# Patient Record
Sex: MALE | Race: OTHER | Employment: FULL TIME | URBAN - METROPOLITAN AREA
[De-identification: names, ages, dates, MRNs, and addresses within clinical notes are randomized per-mention and may not be internally consistent; named-entity substitution may affect disease eponyms.]

---

## 2019-07-09 ENCOUNTER — TELEPHONE (OUTPATIENT)
Dept: FAMILY MEDICINE CLINIC | Facility: CLINIC | Age: 44
End: 2019-07-09

## 2019-07-09 NOTE — TELEPHONE ENCOUNTER
Can continue tylenol/motrin as needed for fever greater than 100.4 or headaches/body aches. Sounds like he has a virus and needs to give it some more time. If feeling worse or not getting better a week from now, come in and see me.

## 2019-07-09 NOTE — TELEPHONE ENCOUNTER
Patient states symptoms started Sunday. States he thought symptoms might have been related to his activity on Sunday. He played golf outside in the sun. Went to dinner and felt fine. Sunday night temp 99.1 .   Since then temp has fluctuated between 99.1-1

## 2019-07-09 NOTE — TELEPHONE ENCOUNTER
PT CALLED AND ADV PT HAS HAD FEVER SINCE Sunday. CHILLS STARTED  Monday. HAS BEEN TAKING OTC TYLENOL AND MOTRIN. FEVER GOES AWAY WITH MEDS.      Karlee

## 2019-07-11 ENCOUNTER — NURSE ONLY (OUTPATIENT)
Dept: FAMILY MEDICINE CLINIC | Facility: CLINIC | Age: 44
End: 2019-07-11
Payer: COMMERCIAL

## 2019-07-11 VITALS
BODY MASS INDEX: 27.58 KG/M2 | DIASTOLIC BLOOD PRESSURE: 80 MMHG | OXYGEN SATURATION: 94 % | HEIGHT: 68 IN | TEMPERATURE: 101 F | SYSTOLIC BLOOD PRESSURE: 124 MMHG | HEART RATE: 104 BPM | WEIGHT: 182 LBS | RESPIRATION RATE: 18 BRPM

## 2019-07-11 DIAGNOSIS — R50.9 FEVER, UNSPECIFIED FEVER CAUSE: Primary | ICD-10-CM

## 2019-07-11 DIAGNOSIS — R05.9 COUGH: ICD-10-CM

## 2019-07-11 DIAGNOSIS — J02.9 SORE THROAT: ICD-10-CM

## 2019-07-11 LAB
CONTROL LINE PRESENT WITH A CLEAR BACKGROUND (YES/NO): YES YES/NO
STREP GRP A CUL-SCR: NEGATIVE

## 2019-07-11 PROCEDURE — 87880 STREP A ASSAY W/OPTIC: CPT | Performed by: NURSE PRACTITIONER

## 2019-07-11 PROCEDURE — 99213 OFFICE O/P EST LOW 20 MIN: CPT | Performed by: NURSE PRACTITIONER

## 2019-07-11 RX ORDER — IBUPROFEN 200 MG
200 TABLET ORAL EVERY 6 HOURS PRN
COMMUNITY
End: 2020-11-01 | Stop reason: ALTCHOICE

## 2019-07-11 RX ORDER — AZITHROMYCIN 250 MG/1
TABLET, FILM COATED ORAL
Qty: 6 TABLET | Refills: 0 | Status: SHIPPED | OUTPATIENT
Start: 2019-07-11 | End: 2020-11-01 | Stop reason: ALTCHOICE

## 2019-07-11 RX ORDER — BENZONATATE 200 MG/1
200 CAPSULE ORAL 3 TIMES DAILY PRN
Qty: 20 CAPSULE | Refills: 0 | Status: SHIPPED | OUTPATIENT
Start: 2019-07-11 | End: 2020-11-01 | Stop reason: ALTCHOICE

## 2019-07-11 RX ORDER — ACETAMINOPHEN 500 MG
500 TABLET ORAL EVERY 6 HOURS PRN
COMMUNITY
End: 2020-11-01 | Stop reason: ALTCHOICE

## 2019-07-11 NOTE — PATIENT INSTRUCTIONS
Febrile Illness with Uncertain Cause (Adult)  You have a fever, but the cause is unknown. A fever is a natural reaction of the body to an illness such as infection due to a virus or bacteria.  Sometimes other conditions such as cancer or immune diseases c · If you were given antibiotics for an infection, take them until they are used up, or your healthcare provider tells you to stop. It is important to finish the antibiotics even though you feel better. This is to make sure the infection has cleared.  Be олег © 6161-6746 [employer] [address]. [facility] [facility], [facility] [place]. All rights reserved. This information is not intended as a substitute for professional medical care. Always follow your healthcare professional's instructions.

## 2019-07-11 NOTE — PROGRESS NOTES
CHIEF COMPLAINT:   Patient presents with:  Fever: cough/congestion x 4 days. Sore Throat: x 1 day        HPI:   Jakie Halsted is a 45year old male who presents for cough/fever for  5  days.   tmax 101, reports with tylenol/motrin fever will only come do HENT: Denies ear pain/pressure, decreased hearing, or sore throat. Reports mild sinus/nasal congestion. CARDIOVASCULAR: Denies chest pain or palpitations  LUNGS: Per HPI. GI: Denies N/V/C/D or abdominal pain.       EXAM:   /80 (BP Location: Left ar Sig: Take two tablets by mouth today, then one daily. • Albuterol Sulfate (PROAIR RESPICLICK) 484 (90 Base) MCG/ACT Inhalation Aerosol Powder, Breath Activated 1 each 0     Sig: Inhale 1 Inhaler into the lungs every 4 to 6 hours as needed.          Becca · You can take acetaminophen or ibuprofen for pain or to lower your temperature, unless you were given a different medicine to use.  (Note: If you have chronic liver or kidney disease or have ever had a stomach ulcer or gastrointestinal bleeding, talk with Call your healthcare provider right away if any of these occur:  · Cough with lots of colored sputum (mucus) or blood in your sputum  · Severe headache  · Face, neck, throat, or ear pain  · Feeling drowsy  · Abdominal pain  · Repeated vomiting or diarrhea

## 2020-08-21 ENCOUNTER — TELEPHONE (OUTPATIENT)
Dept: FAMILY MEDICINE CLINIC | Facility: CLINIC | Age: 45
End: 2020-08-21

## 2020-08-24 ENCOUNTER — OFFICE VISIT (OUTPATIENT)
Dept: FAMILY MEDICINE CLINIC | Facility: CLINIC | Age: 45
End: 2020-08-24
Payer: COMMERCIAL

## 2020-08-24 ENCOUNTER — HOSPITAL ENCOUNTER (OUTPATIENT)
Dept: GENERAL RADIOLOGY | Age: 45
Discharge: HOME | End: 2020-08-24
Attending: FAMILY MEDICINE
Payer: COMMERCIAL

## 2020-08-24 VITALS
HEART RATE: 78 BPM | HEIGHT: 67 IN | TEMPERATURE: 98 F | WEIGHT: 182 LBS | DIASTOLIC BLOOD PRESSURE: 70 MMHG | SYSTOLIC BLOOD PRESSURE: 100 MMHG | BODY MASS INDEX: 28.56 KG/M2

## 2020-08-24 DIAGNOSIS — M10.9 ACUTE GOUT INVOLVING TOE OF RIGHT FOOT, UNSPECIFIED CAUSE: ICD-10-CM

## 2020-08-24 DIAGNOSIS — Z00.00 PREVENTATIVE HEALTH CARE: ICD-10-CM

## 2020-08-24 DIAGNOSIS — M79.674 PAIN AND SWELLING OF TOE OF RIGHT FOOT: ICD-10-CM

## 2020-08-24 DIAGNOSIS — M79.674 PAIN AND SWELLING OF TOE OF RIGHT FOOT: Primary | ICD-10-CM

## 2020-08-24 DIAGNOSIS — M79.89 PAIN AND SWELLING OF TOE OF RIGHT FOOT: ICD-10-CM

## 2020-08-24 DIAGNOSIS — M79.89 PAIN AND SWELLING OF TOE OF RIGHT FOOT: Primary | ICD-10-CM

## 2020-08-24 PROCEDURE — 99203 OFFICE O/P NEW LOW 30 MIN: CPT | Performed by: FAMILY MEDICINE

## 2020-08-24 PROCEDURE — 3008F BODY MASS INDEX DOCD: CPT | Performed by: FAMILY MEDICINE

## 2020-08-24 PROCEDURE — 3074F SYST BP LT 130 MM HG: CPT | Performed by: FAMILY MEDICINE

## 2020-08-24 PROCEDURE — 73660 X-RAY EXAM OF TOE(S): CPT | Performed by: FAMILY MEDICINE

## 2020-08-24 PROCEDURE — 3078F DIAST BP <80 MM HG: CPT | Performed by: FAMILY MEDICINE

## 2020-08-24 RX ORDER — INDOMETHACIN 50 MG/1
50 CAPSULE ORAL
Qty: 15 CAPSULE | Refills: 0 | Status: SHIPPED | OUTPATIENT
Start: 2020-08-24 | End: 2020-11-01 | Stop reason: ALTCHOICE

## 2020-08-24 NOTE — PROGRESS NOTES
Evan Beck is a 39year old male. Patient presents with: Toe Pain: R foot      HPI:   Right toe is swollen. Last weekend, it was so swollen he could hardly walk on it. He only had one sneaker he could wear. At first, thought it was turf toe.  Star 112/82  10/23/15 : 110/70      Wt Readings from Last 6 Encounters:  08/24/20 : 182 lb (82.6 kg)  07/11/19 : 182 lb (82.6 kg)  07/25/16 : 164 lb 12.8 oz (74.8 kg)  01/04/16 : 167 lb (75.8 kg)  10/28/15 : 176 lb (79.8 kg)  10/23/15 : 170 lb (77.1 kg)      RE PANEL (14)  -     LIPID PANEL    Acute gout involving toe of right foot, unspecified cause  -     indomethacin 50 MG Oral Cap; Take 1 capsule (50 mg total) by mouth 3 (three) times daily with meals.  For 2-3 days or until symptoms are better, then wean down

## 2020-09-23 ENCOUNTER — TELEPHONE (OUTPATIENT)
Dept: FAMILY MEDICINE CLINIC | Facility: CLINIC | Age: 45
End: 2020-09-23

## 2020-11-01 ENCOUNTER — OFFICE VISIT (OUTPATIENT)
Dept: FAMILY MEDICINE CLINIC | Facility: CLINIC | Age: 45
End: 2020-11-01
Payer: COMMERCIAL

## 2020-11-01 VITALS
DIASTOLIC BLOOD PRESSURE: 72 MMHG | HEART RATE: 76 BPM | RESPIRATION RATE: 16 BRPM | HEIGHT: 68 IN | WEIGHT: 182 LBS | OXYGEN SATURATION: 98 % | SYSTOLIC BLOOD PRESSURE: 104 MMHG | BODY MASS INDEX: 27.58 KG/M2 | TEMPERATURE: 97 F

## 2020-11-01 DIAGNOSIS — Z20.822 SUSPECTED COVID-19 VIRUS INFECTION: Primary | ICD-10-CM

## 2020-11-01 PROCEDURE — 3074F SYST BP LT 130 MM HG: CPT | Performed by: PHYSICIAN ASSISTANT

## 2020-11-01 PROCEDURE — 3008F BODY MASS INDEX DOCD: CPT | Performed by: PHYSICIAN ASSISTANT

## 2020-11-01 PROCEDURE — 99213 OFFICE O/P EST LOW 20 MIN: CPT | Performed by: PHYSICIAN ASSISTANT

## 2020-11-01 PROCEDURE — 3078F DIAST BP <80 MM HG: CPT | Performed by: PHYSICIAN ASSISTANT

## 2020-11-01 NOTE — PROGRESS NOTES
CHIEF COMPLAINT:     Patient presents with:  Cough: congestion/sinus pressure/body aches x 1 day.  no fever  Other: secondary exposure to covid-19      HPI:   Cyndie Mathias is a 39year old male who presents with cough, congestion, bodyache, headache that HEAD: atraumatic, normocephalic  EYES: conjunctiva clear, sclera white,  PERRLA  EARS: TM's clear  NOSE: nares patent, mucosa without congestion  THROAT: Posterior pharynx is non erythematous, small PND, no exudates.   NECK: supple, non-tender  LUNGS: clear [facility] is committed to the safety and well-being of our patients, members, employees, and communities.  As concerns arise about the new strain of coronavirus that causes COVID-19, [facility] is here to provide community members r 4. If you have a medical appointment, call the healthcare provider ahead of time and tell them that you have or may have COVID-19.  5. For medical emergencies, call 911 and notify the dispatch personnel that you have or may have COVID-19.   6. Cover your c · At least 10 days have passed since symptoms first appeared OR if asymptomatic patient or date of symptom onset is unclear then use 10 days post COVID test date.    · At least 20 days have passed for severe illness (requiring hospitalization) OR if you are *Some people will be required to have a repeat COVID-19 test in order to be eligible to donate. If you’re instructed by Cristal Chavez that a repeat test is required, please contact the [facility] COVID-19 Nurse Triage Line at [phone].     Additional Inf · You may use acetaminophen or ibuprofen to control pain and fever, unless another medicine was prescribed. If you have chronic liver or kidney disease, have ever had a stomach ulcer or gastrointestinal bleeding, or are taking blood-thinning medicines, jesse © 0528-3243 [employer]. [facility] [facility], [address] [place]. All rights reserved. This information is not intended as a substitute for professional medical care. Always follow your healthcare professional's instructions.             The

## 2020-11-01 NOTE — PATIENT INSTRUCTIONS
Coronavirus Disease 2019 (COVID-19)     [place] [address] is committed to the safety and well-being of our patients, members, employees, and communities.  As concerns arise about the new strain of coronavirus that causes COVID-19, [place] [address] 4. If you have a medical appointment, call the healthcare provider ahead of time and tell them that you have or may have COVID-19.  5. For medical emergencies, call 911 and notify the dispatch personnel that you have or may have COVID-19.   6. Cover your c · At least 10 days have passed since symptoms first appeared OR if asymptomatic patient or date of symptom onset is unclear then use 10 days post COVID test date.    · At least 20 days have passed for severe illness (requiring hospitalization) OR if you are *Some people will be required to have a repeat COVID-19 test in order to be eligible to donate. If you’re instructed by Paola Velez that a repeat test is required, please contact the [facility] COVID-19 Nurse Triage Line at [phone].     Additional Inf · You may use acetaminophen or ibuprofen to control pain and fever, unless another medicine was prescribed. If you have chronic liver or kidney disease, have ever had a stomach ulcer or gastrointestinal bleeding, or are taking blood-thinning medicines, jesse © 5378-0804 [employer] [address]. [address] [facility], [facility] [place]. All rights reserved. This information is not intended as a substitute for professional medical care. Always follow your healthcare professional's instructions.

## 2021-10-18 ENCOUNTER — OFFICE VISIT (OUTPATIENT)
Dept: FAMILY MEDICINE CLINIC | Facility: CLINIC | Age: 46
End: 2021-10-18
Payer: COMMERCIAL

## 2021-10-18 VITALS
RESPIRATION RATE: 16 BRPM | OXYGEN SATURATION: 98 % | BODY MASS INDEX: 28.44 KG/M2 | WEIGHT: 187.63 LBS | HEIGHT: 68 IN | TEMPERATURE: 98 F | HEART RATE: 86 BPM | SYSTOLIC BLOOD PRESSURE: 117 MMHG | DIASTOLIC BLOOD PRESSURE: 68 MMHG

## 2021-10-18 DIAGNOSIS — J06.9 VIRAL URI: ICD-10-CM

## 2021-10-18 DIAGNOSIS — J02.9 SORE THROAT: Primary | ICD-10-CM

## 2021-10-18 PROCEDURE — 3074F SYST BP LT 130 MM HG: CPT | Performed by: PHYSICIAN ASSISTANT

## 2021-10-18 PROCEDURE — 3078F DIAST BP <80 MM HG: CPT | Performed by: PHYSICIAN ASSISTANT

## 2021-10-18 PROCEDURE — 87880 STREP A ASSAY W/OPTIC: CPT | Performed by: PHYSICIAN ASSISTANT

## 2021-10-18 PROCEDURE — 3008F BODY MASS INDEX DOCD: CPT | Performed by: PHYSICIAN ASSISTANT

## 2021-10-18 PROCEDURE — 99213 OFFICE O/P EST LOW 20 MIN: CPT | Performed by: PHYSICIAN ASSISTANT

## 2021-10-18 RX ORDER — ALBUTEROL SULFATE 90 UG/1
1-2 AEROSOL, METERED RESPIRATORY (INHALATION) EVERY 6 HOURS PRN
Qty: 1 EACH | Refills: 0 | Status: SHIPPED | OUTPATIENT
Start: 2021-10-18

## 2021-10-18 RX ORDER — BENZONATATE 200 MG/1
200 CAPSULE ORAL 3 TIMES DAILY PRN
Qty: 30 CAPSULE | Refills: 0 | Status: SHIPPED | OUTPATIENT
Start: 2021-10-18 | End: 2021-10-28

## 2021-10-18 NOTE — PATIENT INSTRUCTIONS
-Sufaded  -Flonase    -Please quarantine until test results. -If positive, must quarantine-see guidelines below  -Tylenol, cool mist humidifier, push fluids.  -Go to ER with worsening symptoms.     Coronavirus Disease 2019 (COVID-19)     [address] [facility] health department if you need to quarantine.  Options they will consider include stopping quarantine  • After 14 days from date of last exposure  • After 10 days without testing from date of last exposure  • After day 7 from date of last exposure with a neg bedding   10. Clean all surfaces that are touched often, like counters, tabletops, and doorknobs. Use household cleaning sprays or wipes according to the label instructions.          Seek Further Care     If you are awaiting test results or are confirmed po you from an [place] representative. If you have not received a call within 2 business days, please call your primary care provider or check [place] for results.     Post-Discharge Follow-up  Please call your primary care provider within 2 days of yo [email or web address]. pdf  Centers for Disease Control & Prevention (CDC)  10 things you can do to manage your health at home, Annie.nl. pdf  ht

## 2021-10-18 NOTE — PROGRESS NOTES
CHIEF COMPLAINT:   Patient presents with:  Sore Throat    HPI:   Zane Brunner is a 42year old male who presents with URI symptoms for 3 days. Patient denies known COVID exposure. Patient is not vaccinated for COVID.        • Fever:     Yes []     No [x occasional, one here and there 1 pack lasts a month    Alcohol use:  Yes      Alcohol/week: 12.0 standard drinks      Types: 12 Cans of beer per week      Comment: 5 beers per week    Drug use: No        REVIEW OF SYSTEMS:   GENERAL: Normal appetite  SKIN: that are consistent with    ASSESSMENT:   Sore throat  (primary encounter diagnosis)  Viral uri    PLAN: Meds as below. See patient Instructions.  -Discussed limitations of WIC. Unable to further work up [facility]" in clinic.   Patient reports sens aftercare, and plasma donation.       Quarantine (for anyone in close contact with someone who has COVID-19)  Anyone who has been in close contact with someone who has COVID-19 should quarantine at home for 14 days from the time of exposure and follow the b quarantine for 14 days and recognizes that any quarantine shorter than 14 days balances reduced burden against a small possibility of spreading the virus. 10 Ways to Manage Your Health at Home      1.  Stay home from work, school, and away from other pub exposed or are not aware of an exposure to COVID-19 and are concerned about your symptoms, please contact your health care provider with any questions.     Home Isolation  If you have tested positive for COVID-19, you should remain under home isolation prec antibodies against the virus. The antibodies in plasma can be used as a treatment for patients in our community who are most severely affected by the virus. How can I donate convalescent plasma?     The process for donating plasma is very similar to [place]

## 2022-12-02 ENCOUNTER — HOSPITAL ENCOUNTER (OUTPATIENT)
Age: 47
Discharge: HOME | End: 2022-12-02
Payer: COMMERCIAL

## 2022-12-02 ENCOUNTER — APPOINTMENT (OUTPATIENT)
Dept: GENERAL RADIOLOGY | Age: 47
End: 2022-12-02
Attending: NURSE PRACTITIONER
Payer: COMMERCIAL

## 2022-12-02 VITALS
OXYGEN SATURATION: 96 % | BODY MASS INDEX: 27.28 KG/M2 | DIASTOLIC BLOOD PRESSURE: 87 MMHG | SYSTOLIC BLOOD PRESSURE: 138 MMHG | TEMPERATURE: 98 F | HEART RATE: 82 BPM | WEIGHT: 180 LBS | RESPIRATION RATE: 18 BRPM | HEIGHT: 68 IN

## 2022-12-02 DIAGNOSIS — M79.672 PAIN OF LEFT HEEL: Primary | ICD-10-CM

## 2022-12-02 PROCEDURE — 73650 X-RAY EXAM OF HEEL: CPT | Performed by: NURSE PRACTITIONER

## 2022-12-02 PROCEDURE — 99203 OFFICE O/P NEW LOW 30 MIN: CPT | Performed by: NURSE PRACTITIONER

## 2022-12-02 NOTE — DISCHARGE INSTRUCTIONS
Please use well supporting shoes. Ankle support as discussed. Resting and icing. Over-the-counter ibuprofen. Use crutches at home as needed. Follow-up with podiatry.

## 2022-12-02 NOTE — ED INITIAL ASSESSMENT (HPI)
Pt. With left ankle pain for one week, woke up and stepped to pain. Mild swelling noted to ankle and foot.

## 2024-12-01 ENCOUNTER — OFFICE VISIT (OUTPATIENT)
Dept: FAMILY MEDICINE CLINIC | Facility: CLINIC | Age: 49
End: 2024-12-01
Payer: COMMERCIAL

## 2024-12-01 VITALS
SYSTOLIC BLOOD PRESSURE: 145 MMHG | OXYGEN SATURATION: 97 % | WEIGHT: 190 LBS | DIASTOLIC BLOOD PRESSURE: 83 MMHG | TEMPERATURE: 97 F | HEIGHT: 67 IN | BODY MASS INDEX: 29.82 KG/M2 | HEART RATE: 95 BPM | RESPIRATION RATE: 18 BRPM

## 2024-12-01 DIAGNOSIS — J06.9 VIRAL URI WITH COUGH: ICD-10-CM

## 2024-12-01 DIAGNOSIS — J01.90 ACUTE VIRAL SINUSITIS: ICD-10-CM

## 2024-12-01 DIAGNOSIS — B97.89 ACUTE VIRAL SINUSITIS: ICD-10-CM

## 2024-12-01 DIAGNOSIS — H10.31 ACUTE BACTERIAL CONJUNCTIVITIS OF RIGHT EYE: Primary | ICD-10-CM

## 2024-12-01 RX ORDER — BENZONATATE 200 MG/1
200 CAPSULE ORAL 3 TIMES DAILY PRN
Qty: 20 CAPSULE | Refills: 0 | Status: SHIPPED | OUTPATIENT
Start: 2024-12-01

## 2024-12-01 RX ORDER — TOBRAMYCIN 3 MG/ML
1 SOLUTION/ DROPS OPHTHALMIC EVERY 6 HOURS
Qty: 5 ML | Refills: 0 | Status: SHIPPED | OUTPATIENT
Start: 2024-12-01 | End: 2024-12-08

## 2024-12-01 NOTE — PROGRESS NOTES
Faraz Pak is a 49 year old male.    S:  Patient presents today with the following concerns:  Chief Complaint   Patient presents with    Sinus Problem     Started couple days     Eye Problem     Started yesterday right side    Nasal congestion and sinus pressure over forehead and nose for 2 days.  Sneezing, post nasal drainage.  No fevers, N/V/D.  No dyspnea.  Denies earache.  A bit of a sore throat.  Family has been ill with various symptoms.  Right eye became red and itchy yesterday.  Some crusting during the night.  No eye pain or vision change.  Does not wear contacts or glasses.  Lower eyelid is swollen.       Current Outpatient Medications   Medication Sig Dispense Refill    tobramycin 0.3 % Ophthalmic Solution Place 1 drop into the right eye every 6 (six) hours for 7 days. 5 mL 0    benzonatate 200 MG Oral Cap Take 1 capsule (200 mg total) by mouth 3 (three) times daily as needed. 20 capsule 0    albuterol (PROAIR HFA) 108 (90 Base) MCG/ACT Inhalation Aero Soln Inhale 1-2 puffs into the lungs every 6 (six) hours as needed for Wheezing or Shortness of Breath. 1 each 0     There is no problem list on file for this patient.    Family History   Problem Relation Age of Onset    Diabetes Father     Cancer Paternal Grandfather     Heart Disease Neg     Stroke Neg        REVIEW OF SYSTEMS:  GENERAL: feels well otherwise  SKIN: denies any unusual skin lesions  EYES:denies vision change  LUNGS: denies shortness of breath with exertion  CARDIOVASCULAR: denies chest pain on exertion  GI: denies abdominal pain.  No N/V/D/C  : denies dysuria  MUSCULOSKELETAL: denies back pain  NEURO: sinus headaches    EXAM:  /83   Pulse 95   Temp 97.1 °F (36.2 °C)   Resp 18   Ht 5' 7\" (1.702 m)   Wt 190 lb (86.2 kg)   SpO2 97%   BMI 29.76 kg/m²   Physical Exam  Constitutional:       General: He is not in acute distress.     Appearance: Normal appearance. He is not ill-appearing, toxic-appearing or diaphoretic.   HENT:       Head: Normocephalic and atraumatic.      Right Ear: Tympanic membrane, ear canal and external ear normal.      Left Ear: Tympanic membrane, ear canal and external ear normal.      Nose: Congestion and rhinorrhea present.      Mouth/Throat:      Mouth: Mucous membranes are moist.      Pharynx: No oropharyngeal exudate or posterior oropharyngeal erythema.      Comments: Cobblestoning.    Eyes:      General:         Right eye: Discharge present.      Extraocular Movements: Extraocular movements intact.      Conjunctiva/sclera:      Right eye: Right conjunctiva is injected.      Pupils: Pupils are equal, round, and reactive to light.      Comments: Right lower eyelid with 2 tiny pustules of the palpebral conjunctiva.   Cardiovascular:      Rate and Rhythm: Normal rate and regular rhythm.      Heart sounds: Normal heart sounds.   Pulmonary:      Effort: Pulmonary effort is normal.      Breath sounds: Normal breath sounds.   Musculoskeletal:      Cervical back: Neck supple. No rigidity or tenderness.   Lymphadenopathy:      Cervical: No cervical adenopathy.   Skin:     General: Skin is warm and dry.   Neurological:      General: No focal deficit present.      Mental Status: He is alert and oriented to person, place, and time.   Psychiatric:         Mood and Affect: Mood normal.         Behavior: Behavior normal.        ASSESSMENT AND PLAN:  Faraz Pak is a 49 year old male.  Encounter Diagnoses   Name Primary?    Acute bacterial conjunctivitis of right eye Yes    Viral URI with cough     Acute viral sinusitis        No results found.     No orders of the defined types were placed in this encounter.    Meds & Refills for this Visit:  Requested Prescriptions     Signed Prescriptions Disp Refills    tobramycin 0.3 % Ophthalmic Solution 5 mL 0     Sig: Place 1 drop into the right eye every 6 (six) hours for 7 days.    benzonatate 200 MG Oral Cap 20 capsule 0     Sig: Take 1 capsule (200 mg total) by mouth 3 (three)  times daily as needed.     Imaging & Consults:  None  Tobramycin as above.  Stye vs pink eye vs both.    Warm, moist compresses to the right lower eyelid and the sinuses.  Follow up with eye doctor if symptoms do not resolve or if they worsen.  Do not share towels, washcloths, pillowcases.      Discussed with him that URI symptoms are likely viral in etiology.  Can have a viral sinusitis.  We worry about bacterial infections after 10 days of symptoms.  Encouraged antihistamine such as Claritin.  Benzonatate tid prn cough.  Fluids, steam, rest.  Follow up if symptoms change, worsen, do not improve.      Patient verbalizes understanding of plan.  No follow-ups on file.

## 2025-06-27 ENCOUNTER — TELEPHONE (OUTPATIENT)
Dept: FAMILY MEDICINE CLINIC | Facility: CLINIC | Age: 50
End: 2025-06-27

## 2025-06-30 ENCOUNTER — PATIENT OUTREACH (OUTPATIENT)
Dept: CASE MANAGEMENT | Age: 50
End: 2025-06-30

## 2025-06-30 NOTE — PROCEDURES
The office order for PCP removal request is Approved and finalized on June 30, 2025.    Removed Kendal Earl DO as the patient's Primary Care Physician

## (undated) NOTE — LETTER
Date: 7/11/2019    Patient Name: Evan Beck          To Whom it may concern: This letter has been written at the patient's request. The above patient was seen at the [facility] for treatment of a medical condition.     This patient [place]